# Patient Record
Sex: FEMALE | Race: WHITE | NOT HISPANIC OR LATINO | URBAN - METROPOLITAN AREA
[De-identification: names, ages, dates, MRNs, and addresses within clinical notes are randomized per-mention and may not be internally consistent; named-entity substitution may affect disease eponyms.]

---

## 2020-08-12 ENCOUNTER — LAB OUTSIDE AN ENCOUNTER (OUTPATIENT)
Dept: URBAN - METROPOLITAN AREA CLINIC 121 | Facility: CLINIC | Age: 45
End: 2020-08-12

## 2020-08-12 LAB
BASOPH COUNT: (no result)
BASOPHIL %: 0.7
EOS COUNT: (no result)
EOSINOPHIL %: 2.8
FERRITIN: 193
HCT: 43.1
HGB: 14.4
IRON SATUR %: (no result)
IRON: 111
LYMPHS %: 6.2
MCH: 32.2
MCHC: (no result)
MCV: 96.4
MONOCYTE %: 7.6
MONOSCT AUTO: (no result)
PLATELETS: (no result)
PMN %: 82.7
RBC: (no result)
RDW: 12.1
TIBC: (no result)
WBC: (no result)
ZZ-GE-UNK: (no result)

## 2020-08-17 ENCOUNTER — OFFICE VISIT (OUTPATIENT)
Dept: URBAN - METROPOLITAN AREA CLINIC 27 | Facility: CLINIC | Age: 45
End: 2020-08-17

## 2020-12-16 ENCOUNTER — LAB OUTSIDE AN ENCOUNTER (OUTPATIENT)
Dept: URBAN - METROPOLITAN AREA CLINIC 121 | Facility: CLINIC | Age: 45
End: 2020-12-16

## 2020-12-16 LAB
A/G RATIO: (no result)
ALBUMIN: 3.4
ALK PHOS: 43
B-12: 317
BASOPH COUNT: (no result)
BASOPHIL %: 0.6
BG RANDOM: 89
BILI TOTAL: 0.9
BUN/CREAT: (no result)
BUN: 13
CALCIUM: 9
CHLORIDE: 104
CO2: 26
CREATININE: 0.61
EOS COUNT: (no result)
EOSINOPHIL %: 1.7
FERRITIN: 132
FOLATE: 18
GLOBULIN TOT: (no result)
HCT: 38.5
HGB: 13.1
IRON SATUR %: (no result)
IRON: 94
LYMPHS %: 5.7
MCH: 32
MCHC: (no result)
MCV: 94.1
MONOCYTE %: 8.9
MONOSCT AUTO: (no result)
PLATELETS: (no result)
PMN %: 83.1
POTASSIUM: 4.4
PROTEIN, TOT: 5.4
RBC: (no result)
RDW: 12.5
SGOT (AST): 15
SGPT (ALT): 14
TIBC: (no result)
WBC: (no result)
ZZ-GE-UNK: (no result)
ZZ-GE-UNK: (no result)

## 2021-01-26 ENCOUNTER — OFFICE VISIT (OUTPATIENT)
Dept: URBAN - METROPOLITAN AREA CLINIC 27 | Facility: CLINIC | Age: 46
End: 2021-01-26

## 2021-08-26 ENCOUNTER — OFFICE VISIT (OUTPATIENT)
Dept: URBAN - METROPOLITAN AREA CLINIC 27 | Facility: CLINIC | Age: 46
End: 2021-08-26

## 2021-08-27 ENCOUNTER — LAB OUTSIDE AN ENCOUNTER (OUTPATIENT)
Dept: URBAN - METROPOLITAN AREA CLINIC 121 | Facility: CLINIC | Age: 46
End: 2021-08-27

## 2021-08-27 LAB
A/G RATIO: (no result)
ALBUMIN: 3.6
ALK PHOS: 42
B-12: 747
BASOPH COUNT: (no result)
BASOPHIL %: 0.6
BG RANDOM: 91
BILI TOTAL: 1.3
BUN/CREAT: (no result)
BUN: 13
CALCIUM: 9.2
CHLORIDE: 103
CO2: 28
CREATININE: 0.71
CRP: 0.25
EOS COUNT: (no result)
EOSINOPHIL %: 0.6
FOLATE: 18.5
GLOBULIN TOT: (no result)
HCT: 41.7
HGB: 13.9
LYMPHS %: 5.2
MCH: 31.9
MCHC: (no result)
MCV: 95.6
MONOCYTE %: 9
MONOSCT AUTO: (no result)
PLATELETS: (no result)
PMN %: 84.6
POTASSIUM: 4.3
PROTEIN, TOT: 5.6
RBC: (no result)
RDW: 12.4
SGOT (AST): 12
SGPT (ALT): 11
WBC: (no result)
ZZ-GE-UNK: (no result)
ZZ-GE-UNK: (no result)

## 2021-10-04 ENCOUNTER — OFFICE VISIT (OUTPATIENT)
Dept: URBAN - METROPOLITAN AREA SURGERY CENTER 7 | Facility: SURGERY CENTER | Age: 46
End: 2021-10-04

## 2021-10-09 ENCOUNTER — LAB OUTSIDE AN ENCOUNTER (OUTPATIENT)
Dept: URBAN - METROPOLITAN AREA CLINIC 121 | Facility: CLINIC | Age: 46
End: 2021-10-09

## 2021-10-09 LAB
BILI DIRECT: 0.2
BILI INDIREC: (no result)
BILI TOTAL: 1.3
FERRITIN: 57
INR: 1.1
IRON SATUR %: (no result)
IRON: 104
PT PATIENT: 10.9
TIBC: (no result)

## 2022-02-05 ENCOUNTER — LAB OUTSIDE AN ENCOUNTER (OUTPATIENT)
Dept: URBAN - METROPOLITAN AREA CLINIC 121 | Facility: CLINIC | Age: 47
End: 2022-02-05

## 2022-02-05 LAB
A/G RATIO: (no result)
ALBUMIN: 3.3
ALK PHOS: 43
B-12: 717
BASOPH COUNT: (no result)
BASOPHIL %: 0.7
BG RANDOM: 69
BILI TOTAL: 1.1
BUN/CREAT: (no result)
BUN: 15
CALCIUM: 8.9
CHLORIDE: 108
CO2: 25
CREATININE: 0.68
EOS COUNT: (no result)
EOSINOPHIL %: 1.8
FERRITIN: 6
FOLATE: 14.7
GLOBULIN TOT: (no result)
HCT: 38.5
HGB: 12.6
IRON SATUR %: (no result)
IRON: 84
LYMPHS %: 7.9
MCH: 31.2
MCHC: (no result)
MCV: 95.3
MONOCYTE %: 9.8
MONOSCT AUTO: (no result)
PLATELETS: (no result)
PMN %: 79.8
POTASSIUM: 4.3
PROTEIN, TOT: 5.4
RBC: (no result)
RDW: 13.2
RETIC COUNT: 1.7
SGOT (AST): 11
SGPT (ALT): 13
TIBC: (no result)
WBC: (no result)
ZZ-GE-UNK: (no result)

## 2022-03-07 ENCOUNTER — OFFICE VISIT (OUTPATIENT)
Dept: URBAN - METROPOLITAN AREA CLINIC 27 | Facility: CLINIC | Age: 47
End: 2022-03-07

## 2022-04-30 ENCOUNTER — TELEPHONE ENCOUNTER (OUTPATIENT)
Dept: URBAN - METROPOLITAN AREA CLINIC 121 | Facility: CLINIC | Age: 47
End: 2022-04-30

## 2022-04-30 RX ORDER — MERCAPTOPURINE 50 MG/1
1 PO QD TABLET ORAL
OUTPATIENT
Start: 2013-04-03

## 2022-04-30 RX ORDER — MERCAPTOPURINE 50 MG/1
TAKE 1 1/2 TABLET BY MOUTH DAILY TABLET ORAL
OUTPATIENT
Start: 2020-02-10

## 2022-04-30 RX ORDER — MOMETASONE FUROATE 1 MG/G
CREAM TOPICAL
OUTPATIENT
Start: 2007-10-01

## 2022-04-30 RX ORDER — CYANOCOBALAMIN 1000 UG/ML
INJECT 1ML UNDTHE SKIN ONCE WEEKLY INJECTION INTRAMUSCULAR; SUBCUTANEOUS
OUTPATIENT
Start: 2021-09-27

## 2022-04-30 RX ORDER — CYANOCOBALAMIN 1000 UG/ML
INJECT 1ML SUBCUTANEOUSLY ONCE WEEKLY FOR 1 MONTH INJECTION INTRAMUSCULAR; SUBCUTANEOUS
OUTPATIENT
Start: 2019-05-13

## 2022-04-30 RX ORDER — LORATADINE 5 MG
TABLET,CHEWABLE ORAL
OUTPATIENT
Start: 2017-06-20

## 2022-04-30 RX ORDER — SYRINGE WITH NEEDLE, 1 ML 25GX5/8"
USE AS DIRECTED WITH B12 INJECTION SYRINGE, EMPTY DISPOSABLE MISCELLANEOUS
OUTPATIENT
Start: 2014-05-16

## 2022-04-30 RX ORDER — MERCAPTOPURINE 50 MG/1
TAKE 1/2 TABLET BY MOUTH ONCE A DAY TAKE 1 1/2 TAB PO QD TABLET ORAL
OUTPATIENT
Start: 2021-12-01 | End: 2021-12-09

## 2022-04-30 RX ORDER — CYANOCOBALAMIN 1000 UG/ML
INJECT 1ML UNDTHE SKIN ONCE WEEKLY INJECTION INTRAMUSCULAR; SUBCUTANEOUS
OUTPATIENT
Start: 2021-09-27 | End: 2021-11-02

## 2022-04-30 RX ORDER — MOMETASONE FUROATE 1 MG/G
CREAM TOPICAL
OUTPATIENT
Start: 2013-04-03

## 2022-04-30 RX ORDER — CYANOCOBALAMIN 1000 UG/ML
INJECT 1ML SUBQ EVERY MONTH X 10 MONTHS INJECTION INTRAMUSCULAR; SUBCUTANEOUS
OUTPATIENT
Start: 2014-05-16

## 2022-04-30 RX ORDER — MERCAPTOPURINE 50 MG/1
TAKE 1/2 TABLET BY MOUTH ONCE A DAY TAKE 1 1/2 TAB PO QD TABLET ORAL
OUTPATIENT
Start: 2021-12-01

## 2022-04-30 RX ORDER — MERCAPTOPURINE 50 MG/1
TAKE 1 1/2 TABLET BY MOUTH DAILY TABLET ORAL
OUTPATIENT
Start: 2020-02-10 | End: 2021-12-01

## 2022-04-30 RX ORDER — CYANOCOBALAMIN 1000 UG/ML
INJECT 1 ML UNDER THE SKIN ONCE WEEKLY INJECTION INTRAMUSCULAR; SUBCUTANEOUS
OUTPATIENT
Start: 2021-11-02

## 2022-04-30 RX ORDER — CYANOCOBALAMIN 1000 UG/ML
INJECT 1ML UNDER THE SKIN ONCE WEEKLY INJECTION INTRAMUSCULAR; SUBCUTANEOUS
OUTPATIENT
Start: 2021-02-20

## 2022-04-30 RX ORDER — IBUPROFEN 200 MG/1
TABLET, FILM COATED ORAL
OUTPATIENT
Start: 2007-10-01

## 2022-04-30 RX ORDER — HEPATITIS A AND HEPATITIS B (RECOMBINANT) VACCINE 720; 20 [IU]/ML; UG/ML
INJECT 1 SYRINGE INTRAMUSCULARLY SINGLE DOSE INJECTION, SUSPENSION INTRAMUSCULAR
OUTPATIENT
Start: 2022-03-08 | End: 2022-03-09

## 2022-04-30 RX ORDER — SYRINGE WITH NEEDLE, 1 ML 25GX5/8"
USE AS DIRECTED WITH B12 INJECTION SYRINGE, EMPTY DISPOSABLE MISCELLANEOUS
OUTPATIENT
Start: 2015-06-05

## 2022-04-30 RX ORDER — MERCAPTOPURINE 50 MG/1
TAKE 1 1/2 TABLET BY MOUTH DAILY TABLET ORAL
OUTPATIENT
Start: 2021-12-09

## 2022-04-30 RX ORDER — CYANOCOBALAMIN 1000 UG/ML
INJECT 1ML UNDER THE SKIN ONCE WEEKLY INJECTION INTRAMUSCULAR; SUBCUTANEOUS
OUTPATIENT
Start: 2021-02-20 | End: 2021-09-27

## 2022-04-30 RX ORDER — MERCAPTOPURINE 100 %
POWDER (GRAM) MISCELLANEOUS
OUTPATIENT
Start: 2007-10-01

## 2022-04-30 RX ORDER — CIPROFLOXACIN HCL 500 MG
1 PO BID X7 DAYS TABLET ORAL
OUTPATIENT
Start: 2014-05-16

## 2022-04-30 RX ORDER — MOMETASONE FUROATE 1 MG/G
CREAM TOPICAL
OUTPATIENT
Start: 2013-04-03 | End: 2016-02-12

## 2022-04-30 RX ORDER — MERCAPTOPURINE 50 MG/1
1 TABLET PO QD TABLET ORAL
OUTPATIENT
Start: 2014-03-03

## 2022-04-30 RX ORDER — MERCAPTOPURINE 50 MG/1
1 PO QD TABLET ORAL
OUTPATIENT
Start: 2007-10-05

## 2022-04-30 RX ORDER — IBUPROFEN 200 MG/1
TABLET, FILM COATED ORAL
OUTPATIENT
Start: 2007-10-01 | End: 2013-04-03

## 2022-04-30 RX ORDER — CIPROFLOXACIN HCL 500 MG
1 PO BID X7 DAYS TABLET ORAL
OUTPATIENT
Start: 2014-05-16 | End: 2016-02-12

## 2022-04-30 RX ORDER — MERCAPTOPURINE 50 MG/1
1 PO QD TABLET ORAL
OUTPATIENT
Start: 2013-04-03 | End: 2016-02-12

## 2022-05-01 ENCOUNTER — TELEPHONE ENCOUNTER (OUTPATIENT)
Dept: URBAN - METROPOLITAN AREA CLINIC 121 | Facility: CLINIC | Age: 47
End: 2022-05-01

## 2022-05-01 RX ORDER — SYRINGE WITH NEEDLE, 1 ML 25GX5/8"
USE AS DIRECTED SYRINGE, EMPTY DISPOSABLE MISCELLANEOUS
Status: ACTIVE | COMMUNITY
Start: 2015-06-05

## 2022-05-01 RX ORDER — LORATADINE 5 MG
BY MOUTH TABLET,CHEWABLE ORAL
Status: ACTIVE | COMMUNITY
Start: 2017-06-20

## 2022-05-01 RX ORDER — MERCAPTOPURINE 50 MG/1
TAKE 1 1/2 TABLET BY MOUTH DAILY TABLET ORAL
Status: ACTIVE | COMMUNITY
Start: 2021-12-09 | End: 2022-06-19

## 2022-05-01 RX ORDER — CYANOCOBALAMIN 1000 UG/ML
INJECT 1 ML UNDER THE SKIN ONCE WEEKLY INJECTION INTRAMUSCULAR; SUBCUTANEOUS
Status: ACTIVE | COMMUNITY
Start: 2021-11-02 | End: 2022-06-24

## 2022-05-10 ENCOUNTER — TELEPHONE ENCOUNTER (OUTPATIENT)
Dept: URBAN - METROPOLITAN AREA CLINIC 27 | Facility: CLINIC | Age: 47
End: 2022-05-10

## 2022-05-12 ENCOUNTER — TELEPHONE ENCOUNTER (OUTPATIENT)
Dept: URBAN - METROPOLITAN AREA CLINIC 27 | Facility: CLINIC | Age: 47
End: 2022-05-12

## 2022-06-14 ENCOUNTER — LAB OUTSIDE AN ENCOUNTER (OUTPATIENT)
Dept: URBAN - METROPOLITAN AREA CLINIC 27 | Facility: CLINIC | Age: 47
End: 2022-06-14

## 2022-06-15 LAB
ABSOLUTE BASOPHILS: 29
ABSOLUTE EOSINOPHILS: 122
ABSOLUTE LYMPHOCYTES: 336
ABSOLUTE MONOCYTES: 476
ABSOLUTE NEUTROPHILS: 4837
BASOPHILS: 0.5
EOSINOPHILS: 2.1
HEMATOCRIT: 36.4
HEMOGLOBIN: 12.1
LYMPHOCYTES: 5.8
MCH: 30.9
MCHC: 33.2
MCV: 93.1
MONOCYTES: 8.2
MPV: 10.2
NEUTROPHILS: 83.4
PLATELET COUNT: 344
RDW: 14.8
RED BLOOD CELL COUNT: 3.91
WHITE BLOOD CELL COUNT: 5.8

## 2022-06-20 ENCOUNTER — ERX REFILL RESPONSE (OUTPATIENT)
Dept: URBAN - METROPOLITAN AREA CLINIC 27 | Facility: CLINIC | Age: 47
End: 2022-06-20

## 2022-06-20 RX ORDER — MERCAPTOPURINE 50 MG/1
TAKE 1 AND 1/2 BY MOUTH DAILY TABLET ORAL
Qty: 45 TABLET | Refills: 0 | OUTPATIENT

## 2022-06-21 ENCOUNTER — ERX REFILL RESPONSE (OUTPATIENT)
Dept: URBAN - METROPOLITAN AREA CLINIC 27 | Facility: CLINIC | Age: 47
End: 2022-06-21

## 2022-06-21 RX ORDER — CYANOCOBALAMIN 1000 UG/ML
INJECT 1ML UNTHE SKIN ONCE WEEKLY INJECTION INTRAMUSCULAR; SUBCUTANEOUS
Qty: 4 MILLILITER | Refills: 0 | OUTPATIENT

## 2022-06-21 RX ORDER — CYANOCOBALAMIN 1000 UG/ML
INJECT 1 ML UNDER THE SKIN ONCE WEEKLY INJECTION INTRAMUSCULAR; SUBCUTANEOUS
OUTPATIENT

## 2022-07-06 ENCOUNTER — LAB OUTSIDE AN ENCOUNTER (OUTPATIENT)
Dept: URBAN - METROPOLITAN AREA CLINIC 27 | Facility: CLINIC | Age: 47
End: 2022-07-06

## 2022-07-07 LAB
FERRITIN, SERUM: 6
IRON BIND.CAP.(TIBC): 430
IRON SATURATION: 19
IRON: 80

## 2022-07-18 ENCOUNTER — TELEPHONE ENCOUNTER (OUTPATIENT)
Dept: URBAN - METROPOLITAN AREA CLINIC 27 | Facility: CLINIC | Age: 47
End: 2022-07-18

## 2022-07-18 RX ORDER — MERCAPTOPURINE 50 MG/1
TAKE 1 AND 1/2 BY MOUTH TABLET ORAL
Qty: 50 | Refills: 3

## 2022-07-18 RX ORDER — CYANOCOBALAMIN 1000 UG/ML
INJECT 1ML INJECTION INTRAMUSCULAR; SUBCUTANEOUS
Qty: 6 | Refills: 2

## 2022-07-19 ENCOUNTER — ERX REFILL RESPONSE (OUTPATIENT)
Dept: URBAN - METROPOLITAN AREA CLINIC 27 | Facility: CLINIC | Age: 47
End: 2022-07-19

## 2022-07-19 RX ORDER — MERCAPTOPURINE 50 MG/1
TAKE 1 AND 1/2 BY MOUTH TABLET ORAL
Qty: 50 | Refills: 3 | OUTPATIENT

## 2022-07-19 RX ORDER — MERCAPTOPURINE 50 MG/1
TAKE 1 AND 1/2 BY MOUTH DAILY TABLET ORAL
Qty: 45 TABLET | Refills: 0 | OUTPATIENT

## 2022-08-23 ENCOUNTER — TELEPHONE ENCOUNTER (OUTPATIENT)
Dept: URBAN - METROPOLITAN AREA CLINIC 27 | Facility: CLINIC | Age: 47
End: 2022-08-23

## 2022-09-14 ENCOUNTER — WEB ENCOUNTER (OUTPATIENT)
Dept: URBAN - METROPOLITAN AREA CLINIC 27 | Facility: CLINIC | Age: 47
End: 2022-09-14

## 2022-09-16 ENCOUNTER — OFFICE VISIT (OUTPATIENT)
Dept: URBAN - METROPOLITAN AREA CLINIC 27 | Facility: CLINIC | Age: 47
End: 2022-09-16
Payer: COMMERCIAL

## 2022-09-16 VITALS
TEMPERATURE: 96.9 F | HEIGHT: 62 IN | WEIGHT: 129 LBS | BODY MASS INDEX: 23.74 KG/M2 | RESPIRATION RATE: 17 BRPM | SYSTOLIC BLOOD PRESSURE: 112 MMHG | DIASTOLIC BLOOD PRESSURE: 83 MMHG | HEART RATE: 80 BPM

## 2022-09-16 DIAGNOSIS — K50.90 CROHN'S DISEASE, UNSPECIFIED, WITHOUT COMPLICATIONS: ICD-10-CM

## 2022-09-16 DIAGNOSIS — D51.9 VITAMIN B12 DEFICIENCY: ICD-10-CM

## 2022-09-16 DIAGNOSIS — E61.1 IRON DEFICIENCY: ICD-10-CM

## 2022-09-16 PROBLEM — 35240004 IRON DEFICIENCY: Status: ACTIVE | Noted: 2022-09-16

## 2022-09-16 PROCEDURE — 99214 OFFICE O/P EST MOD 30 MIN: CPT | Performed by: INTERNAL MEDICINE

## 2022-09-16 RX ORDER — LORATADINE 5 MG
BY MOUTH TABLET,CHEWABLE ORAL
Status: ACTIVE | COMMUNITY
Start: 2017-06-20

## 2022-09-16 RX ORDER — MERCAPTOPURINE 50 MG/1
TAKE 1 AND 1/2 BY MOUTH DAILY TABLET ORAL
Qty: 45 TABLET | Refills: 0 | Status: ACTIVE | COMMUNITY

## 2022-09-16 RX ORDER — SYRINGE WITH NEEDLE, 1 ML 25GX5/8"
USE AS DIRECTED SYRINGE, EMPTY DISPOSABLE MISCELLANEOUS
Status: ACTIVE | COMMUNITY
Start: 2015-06-05

## 2022-09-16 RX ORDER — CYANOCOBALAMIN 1000 UG/ML
INJECT 1ML INJECTION INTRAMUSCULAR; SUBCUTANEOUS
Qty: 6 | Refills: 2 | Status: ACTIVE | COMMUNITY

## 2022-09-16 NOTE — HPI-TODAY'S VISIT:
This is a 47-year-old female seen in follow-up consultation for her Crohn's disease.  She is at her baseline.  She does have chronic mild abdominal discomfort.  She is careful with what she eats.  She has gained about 3 or 4 pounds.  She just finished her hepatitis A and B vaccine.  She took her fourth booster.  Her DEXA scan last year was okay.  She has B12 deficiency.  Laboratory studies in July revealed a normal CBC and a ferritin of 6.  She subsequently received 2 infusions of Injectafer.  Last colonoscopy with normal biopsies most of the disease and stricturing remains within the small intestine.  Last MR enteroscopy in 2018 was reviewed.  Recent dermatologic exam with removal of moles.  She is up-to-date with her eye exams with mild cataracts

## 2022-11-29 ENCOUNTER — TELEPHONE ENCOUNTER (OUTPATIENT)
Dept: URBAN - METROPOLITAN AREA CLINIC 27 | Facility: CLINIC | Age: 47
End: 2022-11-29

## 2022-11-29 RX ORDER — CYANOCOBALAMIN 1000 UG/ML
INJECT 1ML INJECTION INTRAMUSCULAR; SUBCUTANEOUS
Qty: 6 | Refills: 2
End: 2023-02-21

## 2022-11-29 RX ORDER — MERCAPTOPURINE 50 MG/1
TAKE 1 AND 1/2 BY MOUTH DAILY TABLET ORAL
Qty: 45 TABLET | Refills: 0

## 2022-12-27 ENCOUNTER — TELEPHONE ENCOUNTER (OUTPATIENT)
Dept: URBAN - METROPOLITAN AREA CLINIC 27 | Facility: CLINIC | Age: 47
End: 2022-12-27

## 2023-01-12 ENCOUNTER — LAB OUTSIDE AN ENCOUNTER (OUTPATIENT)
Dept: URBAN - METROPOLITAN AREA CLINIC 27 | Facility: CLINIC | Age: 48
End: 2023-01-12

## 2023-01-13 LAB
ABSOLUTE BASOPHILS: 31
ABSOLUTE EOSINOPHILS: 68
ABSOLUTE LYMPHOCYTES: 298
ABSOLUTE MONOCYTES: 490
ABSOLUTE NEUTROPHILS: 5313
BASOPHILS: 0.5
CHOL/HDLC RATIO: 2.7
CHOLESTEROL, TOTAL: 252
EOSINOPHILS: 1.1
FERRITIN, SERUM: 259
FOLATE (FOLIC ACID), SERUM: 15.5
HDL CHOLESTEROL: 94
HEMATOCRIT: 38.1
HEMOGLOBIN: 13
IRON BIND.CAP.(TIBC): 274
IRON SATURATION: 35
IRON: 97
LDL CHOLESTEROL CALC: 128
LYMPHOCYTES: 4.8
MCH: 32.5
MCHC: 34.1
MCV: 95.3
MONOCYTES: 7.9
MPV: 10.2
NEUTROPHILS: 85.7
NON HDL CHOLESTEROL: 158
PLATELET COUNT: 314
RDW: 12.2
RED BLOOD CELL COUNT: 4
TRIGLYCERIDES: 180
VITAMIN B12: 463
WHITE BLOOD CELL COUNT: 6.2

## 2023-01-24 ENCOUNTER — ERX REFILL RESPONSE (OUTPATIENT)
Dept: URBAN - METROPOLITAN AREA CLINIC 27 | Facility: CLINIC | Age: 48
End: 2023-01-24

## 2023-01-24 RX ORDER — MERCAPTOPURINE 50 MG/1
TAKE 1 AND 1/2 BY MOUTH DAILY TABLET ORAL
Qty: 45 TABLET | Refills: 0 | OUTPATIENT

## 2023-01-24 RX ORDER — MERCAPTOPURINE 50 MG/1
TAKE 1 AND 1/2 BY MOUTH DAILY TABLET ORAL
Qty: 45 TABLET | Refills: 3 | OUTPATIENT

## 2023-03-16 ENCOUNTER — OFFICE VISIT (OUTPATIENT)
Dept: URBAN - METROPOLITAN AREA CLINIC 27 | Facility: CLINIC | Age: 48
End: 2023-03-16
Payer: COMMERCIAL

## 2023-03-16 VITALS
HEART RATE: 76 BPM | SYSTOLIC BLOOD PRESSURE: 122 MMHG | RESPIRATION RATE: 17 BRPM | HEIGHT: 62 IN | WEIGHT: 129 LBS | DIASTOLIC BLOOD PRESSURE: 92 MMHG | BODY MASS INDEX: 23.74 KG/M2

## 2023-03-16 DIAGNOSIS — E61.1 IRON DEFICIENCY: ICD-10-CM

## 2023-03-16 DIAGNOSIS — K50.90 CROHN'S DISEASE, UNSPECIFIED, WITHOUT COMPLICATIONS: ICD-10-CM

## 2023-03-16 PROCEDURE — 99214 OFFICE O/P EST MOD 30 MIN: CPT | Performed by: INTERNAL MEDICINE

## 2023-03-16 RX ORDER — SYRINGE WITH NEEDLE, 1 ML 25GX5/8"
USE AS DIRECTED SYRINGE, EMPTY DISPOSABLE MISCELLANEOUS
Status: ACTIVE | COMMUNITY
Start: 2015-06-05

## 2023-03-16 RX ORDER — MERCAPTOPURINE 50 MG/1
TAKE 1 AND 1/2 BY MOUTH DAILY TABLET ORAL
Qty: 45 TABLET | Refills: 3 | Status: ACTIVE | COMMUNITY

## 2023-03-16 RX ORDER — LORATADINE 5 MG
BY MOUTH TABLET,CHEWABLE ORAL
Status: ACTIVE | COMMUNITY
Start: 2017-06-20

## 2023-03-16 NOTE — HPI-TODAY'S VISIT:
This is is a 47-year-old female seen in follow-up consultation for Crohn's.  She states she has been doing well over the past year.  She takes MiraLAX every day and that helps her with symptoms.  She has had no change in abdominal discomfort.  She received iron injections in the fall.  In January her ferritin was 259.  Hematocrit was 38.  B12 was 463.  She does have some joint pain but does not want to further evaluate that at this point.  She exercises regularly she gets regular massages which help.  She had a recent cholesterol that was not fasting and knows she needs to get a proper fasting cholesterol to understand her risks.  Overall she is doing well.  She has gained a few pounds this past year.  She has received vaccination for hepatitis a and B.

## 2023-05-01 ENCOUNTER — ERX REFILL RESPONSE (OUTPATIENT)
Dept: URBAN - METROPOLITAN AREA CLINIC 27 | Facility: CLINIC | Age: 48
End: 2023-05-01

## 2023-05-01 RX ORDER — CYANOCOBALAMIN 1000 UG/ML
INJECT 1ML UNDER THE SKIN ONCE A WEEK INJECTION INTRAMUSCULAR; SUBCUTANEOUS
Qty: 6 MILLILITER | Refills: 0 | OUTPATIENT

## 2023-05-18 ENCOUNTER — TELEPHONE ENCOUNTER (OUTPATIENT)
Dept: URBAN - METROPOLITAN AREA CLINIC 27 | Facility: CLINIC | Age: 48
End: 2023-05-18

## 2023-05-22 ENCOUNTER — ERX REFILL RESPONSE (OUTPATIENT)
Dept: URBAN - METROPOLITAN AREA CLINIC 27 | Facility: CLINIC | Age: 48
End: 2023-05-22

## 2023-05-22 RX ORDER — MERCAPTOPURINE 50 MG/1
TAKE 1 AND 1/2 BY MOUTH DAILY TABLET ORAL
Qty: 45 TABLET | Refills: 3 | OUTPATIENT

## 2023-05-22 RX ORDER — MERCAPTOPURINE 50 MG/1
TAKE 1 AND 1/2 BY MOUTH DAILY TABLET ORAL
Qty: 45 TABLET | Refills: 0 | OUTPATIENT

## 2023-05-26 ENCOUNTER — LAB OUTSIDE AN ENCOUNTER (OUTPATIENT)
Dept: URBAN - METROPOLITAN AREA CLINIC 27 | Facility: CLINIC | Age: 48
End: 2023-05-26

## 2023-06-02 LAB
ABSOLUTE BASOPHILS: 29
ABSOLUTE EOSINOPHILS: 70
ABSOLUTE LYMPHOCYTES: 267
ABSOLUTE MONOCYTES: 458
ABSOLUTE NEUTROPHILS: 4976
BASOPHILS: 0.5
CALPROTECTIN, FECAL: 109
EOSINOPHILS: 1.2
FERRITIN, SERUM: 185
HEMATOCRIT: 41.5
HEMOGLOBIN: 13.7
HEPATITIS A AB, TOTAL: REACTIVE
HEPATITIS B SURFACE AB IMMUNITY, QN: <5
IRON BIND.CAP.(TIBC): 285
IRON SATURATION: 54
IRON: 154
LYMPHOCYTES: 4.6
MCH: 31.3
MCHC: 33
MCV: 94.7
MONOCYTES: 7.9
MPV: 10.3
NEUTROPHILS: 85.8
PLATELET COUNT: 256
RDW: 12.6
RED BLOOD CELL COUNT: 4.38
WHITE BLOOD CELL COUNT: 5.8

## 2023-06-21 ENCOUNTER — ERX REFILL RESPONSE (OUTPATIENT)
Dept: URBAN - METROPOLITAN AREA CLINIC 27 | Facility: CLINIC | Age: 48
End: 2023-06-21

## 2023-06-21 RX ORDER — MERCAPTOPURINE 50 MG/1
TAKE 1 AND 1/2 BY MOUTH DAILY TABLET ORAL
Qty: 45 TABLET | Refills: 0 | OUTPATIENT

## 2023-06-26 ENCOUNTER — ERX REFILL RESPONSE (OUTPATIENT)
Dept: URBAN - METROPOLITAN AREA CLINIC 27 | Facility: CLINIC | Age: 48
End: 2023-06-26

## 2023-06-26 RX ORDER — CYANOCOBALAMIN 1000 UG/ML
INJECT 1ML UNDER THE SKIN ONCE A WEEK INJECTION INTRAMUSCULAR; SUBCUTANEOUS
Qty: 6 MILLILITER | Refills: 0 | OUTPATIENT

## 2023-07-24 ENCOUNTER — ERX REFILL RESPONSE (OUTPATIENT)
Dept: URBAN - METROPOLITAN AREA CLINIC 27 | Facility: CLINIC | Age: 48
End: 2023-07-24

## 2023-07-24 RX ORDER — MERCAPTOPURINE 50 MG/1
TAKE 1 AND 1/2 BY MOUTH DAILY TABLET ORAL
Qty: 45 TABLET | Refills: 0 | OUTPATIENT

## 2023-08-02 ENCOUNTER — TELEPHONE ENCOUNTER (OUTPATIENT)
Dept: URBAN - METROPOLITAN AREA CLINIC 27 | Facility: CLINIC | Age: 48
End: 2023-08-02

## 2023-08-02 RX ORDER — CYANOCOBALAMIN 1000 UG/ML
INJECT 1ML UNDER THE SKIN ONCE A WEEK INJECTION INTRAMUSCULAR; SUBCUTANEOUS
Qty: 4 PEN NEEDLE | Refills: 1

## 2023-08-21 ENCOUNTER — ERX REFILL RESPONSE (OUTPATIENT)
Dept: URBAN - METROPOLITAN AREA CLINIC 27 | Facility: CLINIC | Age: 48
End: 2023-08-21

## 2023-08-21 RX ORDER — MERCAPTOPURINE 50 MG/1
TAKE 1 AND 1/2 BY MOUTH DAILY TABLET ORAL
Qty: 45 TABLET | Refills: 0 | OUTPATIENT

## 2023-09-12 ENCOUNTER — WEB ENCOUNTER (OUTPATIENT)
Dept: URBAN - METROPOLITAN AREA CLINIC 27 | Facility: CLINIC | Age: 48
End: 2023-09-12

## 2023-09-15 ENCOUNTER — LAB OUTSIDE AN ENCOUNTER (OUTPATIENT)
Dept: URBAN - METROPOLITAN AREA CLINIC 27 | Facility: CLINIC | Age: 48
End: 2023-09-15

## 2023-09-15 ENCOUNTER — OFFICE VISIT (OUTPATIENT)
Dept: URBAN - METROPOLITAN AREA CLINIC 27 | Facility: CLINIC | Age: 48
End: 2023-09-15
Payer: COMMERCIAL

## 2023-09-15 VITALS
DIASTOLIC BLOOD PRESSURE: 79 MMHG | RESPIRATION RATE: 17 BRPM | WEIGHT: 129 LBS | HEIGHT: 62 IN | BODY MASS INDEX: 23.74 KG/M2 | SYSTOLIC BLOOD PRESSURE: 129 MMHG | HEART RATE: 80 BPM

## 2023-09-15 DIAGNOSIS — K50.90 CROHN'S DISEASE, UNSPECIFIED, WITHOUT COMPLICATIONS: ICD-10-CM

## 2023-09-15 DIAGNOSIS — E61.1 IRON DEFICIENCY: ICD-10-CM

## 2023-09-15 PROCEDURE — 99213 OFFICE O/P EST LOW 20 MIN: CPT | Performed by: INTERNAL MEDICINE

## 2023-09-15 RX ORDER — SYRINGE WITH NEEDLE, 1 ML 25GX5/8"
USE AS DIRECTED SYRINGE, EMPTY DISPOSABLE MISCELLANEOUS
Status: ACTIVE | COMMUNITY
Start: 2015-06-05

## 2023-09-15 RX ORDER — MERCAPTOPURINE 50 MG/1
TAKE 1 AND 1/2 BY MOUTH DAILY TABLET ORAL
Qty: 45 TABLET | Refills: 0 | Status: ACTIVE | COMMUNITY

## 2023-09-15 RX ORDER — CYANOCOBALAMIN 1000 UG/ML
INJECT 1ML UNDER THE SKIN ONCE A WEEK INJECTION INTRAMUSCULAR; SUBCUTANEOUS
Qty: 4 PEN NEEDLE | Refills: 1 | Status: ACTIVE | COMMUNITY

## 2023-09-15 RX ORDER — LORATADINE 5 MG
BY MOUTH TABLET,CHEWABLE ORAL
Status: ACTIVE | COMMUNITY
Start: 2017-06-20

## 2023-09-15 NOTE — HPI-TODAY'S VISIT:
This is a 48-year-old female seen in consultation today for her Crohn's disease.  Generally she states that she is doing okay.  She knows she has some small bowel strictures and if she eats the wrong foods she will develop crampy abdominal discomfort.  Generally her bowel movements are okay.  Her last DEXA scan was in 21.  Colonoscopy as well in 21 with normal biopsies.  She takes Imuran 75 mg a day.  MR JONATHAN in 2018 showed small bowel strictures.  She has been iron deficient as well as B12 deficient.  She complains of some joint discomfort.  In May her calprotectin was 109 normal she has been vaccinated for hepatitis B twice.  She is immune to hepatitis A but continues with lack of immunity to hepatitis B .

## 2023-09-18 LAB
A/G RATIO: 1.8
ABSOLUTE BASOPHILS: 31
ABSOLUTE EOSINOPHILS: 67
ABSOLUTE LYMPHOCYTES: 317
ABSOLUTE MONOCYTES: 506
ABSOLUTE NEUTROPHILS: 5179
ALBUMIN: 3.4
ALKALINE PHOSPHATASE: 41
ALT (SGPT): 15
AST (SGOT): 15
BASOPHILS: 0.5
BILIRUBIN, TOTAL: 1.2
BUN/CREATININE RATIO: (no result)
BUN: 14
C-REACTIVE PROTEIN, QUANT: 1.2
CALCIUM: 8.9
CARBON DIOXIDE, TOTAL: 28
CHLORIDE: 105
CREATININE: 0.64
EGFR: 108
EOSINOPHILS: 1.1
FERRITIN, SERUM: 157
FOLATE (FOLIC ACID), SERUM: 19.5
GLOBULIN, TOTAL: 1.9
GLUCOSE: 79
HEMATOCRIT: 40.2
HEMOGLOBIN: 13.6
IRON BIND.CAP.(TIBC): 307
IRON SATURATION: 37
IRON: 113
LYMPHOCYTES: 5.2
MCH: 31.9
MCHC: 33.8
MCV: 94.1
MONOCYTES: 8.3
MPV: 10.5
NEUTROPHILS: 84.9
PLATELET COUNT: 247
POTASSIUM: 4.5
PROTEIN, TOTAL: 5.3
RDW: 12.5
RED BLOOD CELL COUNT: 4.27
SODIUM: 139
VITAMIN B12: 662
WHITE BLOOD CELL COUNT: 6.1

## 2023-09-22 ENCOUNTER — TELEPHONE ENCOUNTER (OUTPATIENT)
Dept: URBAN - METROPOLITAN AREA CLINIC 27 | Facility: CLINIC | Age: 48
End: 2023-09-22

## 2023-09-22 ENCOUNTER — ERX REFILL RESPONSE (OUTPATIENT)
Dept: URBAN - METROPOLITAN AREA CLINIC 27 | Facility: CLINIC | Age: 48
End: 2023-09-22

## 2023-09-22 RX ORDER — MERCAPTOPURINE 50 MG/1
TAKE 1 AND 1/2 BY MOUTH DAILY TABLET ORAL
Qty: 45 TABLET | Refills: 0 | OUTPATIENT

## 2023-09-22 RX ORDER — MERCAPTOPURINE 50 MG/1
TAKE 1 AND 1/2 BY MOUTH DAILY TABLET ORAL ONCE A DAY
Qty: 135 | Refills: 3

## 2023-11-10 ENCOUNTER — ERX REFILL RESPONSE (OUTPATIENT)
Dept: URBAN - METROPOLITAN AREA CLINIC 27 | Facility: CLINIC | Age: 48
End: 2023-11-10

## 2023-11-10 RX ORDER — MERCAPTOPURINE 50 MG/1
TAKE 1 AND 1/2 BY MOUTH DAILY TABLET ORAL ONCE A DAY
Qty: 135 | Refills: 3 | OUTPATIENT

## 2023-11-10 RX ORDER — MERCAPTOPURINE 50 MG/1
TAKE 1 AND 1/2 BY MOUTH DAILY TABLET ORAL
Qty: 45 TABLET | Refills: 0 | OUTPATIENT

## 2024-01-08 ENCOUNTER — ERX REFILL RESPONSE (OUTPATIENT)
Dept: URBAN - METROPOLITAN AREA CLINIC 27 | Facility: CLINIC | Age: 49
End: 2024-01-08

## 2024-01-08 RX ORDER — CYANOCOBALAMIN 1000 UG/ML
INJECT 1 MILLILITER INJECTION ONCE WEEKLY INJECTION INTRAMUSCULAR; SUBCUTANEOUS
Qty: 4 MILLILITER | Refills: 0 | OUTPATIENT

## 2024-01-08 RX ORDER — CYANOCOBALAMIN 1000 UG/ML
INJECT 1ML UNDER THE SKIN ONCE A WEEK INJECTION INTRAMUSCULAR; SUBCUTANEOUS
Qty: 4 PEN NEEDLE | Refills: 1 | OUTPATIENT

## 2024-04-12 ENCOUNTER — OV EP (OUTPATIENT)
Dept: URBAN - METROPOLITAN AREA CLINIC 27 | Facility: CLINIC | Age: 49
End: 2024-04-12
Payer: COMMERCIAL

## 2024-04-12 VITALS
DIASTOLIC BLOOD PRESSURE: 85 MMHG | WEIGHT: 120 LBS | HEIGHT: 62 IN | SYSTOLIC BLOOD PRESSURE: 124 MMHG | RESPIRATION RATE: 17 BRPM | BODY MASS INDEX: 22.08 KG/M2 | HEART RATE: 72 BPM

## 2024-04-12 DIAGNOSIS — E53.8 VITAMIN B 12 DEFICIENCY: ICD-10-CM

## 2024-04-12 DIAGNOSIS — K50.90 CROHN'S DISEASE, UNSPECIFIED, WITHOUT COMPLICATIONS: ICD-10-CM

## 2024-04-12 DIAGNOSIS — E61.1 IRON DEFICIENCY: ICD-10-CM

## 2024-04-12 PROCEDURE — 99214 OFFICE O/P EST MOD 30 MIN: CPT | Performed by: INTERNAL MEDICINE

## 2024-04-12 RX ORDER — LORATADINE 5 MG
BY MOUTH TABLET,CHEWABLE ORAL
Status: ACTIVE | COMMUNITY
Start: 2017-06-20

## 2024-04-12 RX ORDER — CYANOCOBALAMIN 1000 UG/ML
INJECT 1 MILLILITER INJECTION ONCE WEEKLY INJECTION INTRAMUSCULAR; SUBCUTANEOUS
Qty: 4 MILLILITER | Refills: 0 | Status: ACTIVE | COMMUNITY

## 2024-04-12 RX ORDER — SYRINGE WITH NEEDLE, 1 ML 25GX5/8"
USE AS DIRECTED SYRINGE, EMPTY DISPOSABLE MISCELLANEOUS
Status: ACTIVE | COMMUNITY
Start: 2015-06-05

## 2024-04-12 RX ORDER — MERCAPTOPURINE 50 MG/1
TAKE 1 AND 1/2 BY MOUTH DAILY TABLET ORAL
Qty: 45 TABLET | Refills: 0 | Status: ACTIVE | COMMUNITY

## 2024-04-12 NOTE — HPI-TODAY'S VISIT:
this is a 48-year-old female seen in follow-up consultation for Crohn's disease.  She has known small bowel stricturing.  She on her own decided to cut her 6-MP in half in January and developed increasing abdominal symptoms.  In February she increase the dose back to her normal and she is doing better now.  She also had her estrogen stopped recently and wonders whether that is contributing to some of her symptoms.  She saw Dr. Heath and was told she does not have arthritis.  She has not followed up for her DEXA scan.  She was vaccinated twice for hepatitis B but is not immune.  She is immune to hepatitis A.  She has B12 deficiency as well and previous iron deficiency

## 2024-04-17 LAB
6 MMP: 6369
6 TG: 249
A/G RATIO: 2.4
ALBUMIN: 4
ALKALINE PHOSPHATASE: 58
ALT (SGPT): 43
AST (SGOT): 21
BILIRUBIN, TOTAL: 2.2
BUN/CREATININE RATIO: (no result)
BUN: 15
C-REACTIVE PROTEIN, QUANT: 0.4
CALCIUM: 10
CARBON DIOXIDE, TOTAL: 30
CHLORIDE: 104
CREATININE: 0.64
EGFR: 108
FOLATE (FOLIC ACID), SERUM: 23.1
GLOBULIN, TOTAL: 1.7
GLUCOSE: 88
HEMATOCRIT: 40.8
HEMOGLOBIN: 13.6
MCH: 31.3
MCHC: 33.3
MCV: 94
MPV: 10.6
PLATELET COUNT: 256
POTASSIUM: 4.6
PROTEIN, TOTAL: 5.7
RDW: 13.5
RED BLOOD CELL COUNT: 4.34
SODIUM: 144
VITAMIN B12: 795
WHITE BLOOD CELL COUNT: 4.1

## 2024-04-26 LAB — CALPROTECTIN, FECAL: 177

## 2024-05-08 ENCOUNTER — TELEPHONE ENCOUNTER (OUTPATIENT)
Dept: URBAN - METROPOLITAN AREA CLINIC 27 | Facility: CLINIC | Age: 49
End: 2024-05-08

## 2024-05-14 LAB
ALBUMIN/GLOBULIN RATIO: 2.5
ALBUMIN: 3.5
ALKALINE PHOSPHATASE: 61
ALT (SGPT): 163
AST (SGOT): 51
BILIRUBIN, DIRECT: 0.2
BILIRUBIN, INDIRECT: 0.6
BILIRUBIN, TOTAL: 0.8
GLOBULIN: 1.4
IRON BIND.CAP.(TIBC): 280
IRON SATURATION: 23
IRON: 63
PROTEIN, TOTAL: 4.9

## 2024-05-20 ENCOUNTER — ERX REFILL RESPONSE (OUTPATIENT)
Dept: URBAN - METROPOLITAN AREA CLINIC 27 | Facility: CLINIC | Age: 49
End: 2024-05-20

## 2024-05-20 RX ORDER — CYANOCOBALAMIN 1000 UG/ML
INJECT 1 MILLILITER INJECTION ONCE WEEKLY INJECTION INTRAMUSCULAR; SUBCUTANEOUS
Qty: 4 MILLILITER | Refills: 0 | OUTPATIENT

## 2024-05-21 ENCOUNTER — TELEPHONE ENCOUNTER (OUTPATIENT)
Dept: URBAN - METROPOLITAN AREA CLINIC 27 | Facility: CLINIC | Age: 49
End: 2024-05-21

## 2024-05-30 ENCOUNTER — TELEPHONE ENCOUNTER (OUTPATIENT)
Dept: URBAN - METROPOLITAN AREA CLINIC 27 | Facility: CLINIC | Age: 49
End: 2024-05-30

## 2024-07-08 ENCOUNTER — ERX REFILL RESPONSE (OUTPATIENT)
Dept: URBAN - METROPOLITAN AREA CLINIC 27 | Facility: CLINIC | Age: 49
End: 2024-07-08

## 2024-07-08 RX ORDER — CYANOCOBALAMIN 1000 UG/ML
INJECT 1 MILLILITER INJECTION ONCE WEEKLY INJECTION INTRAMUSCULAR; SUBCUTANEOUS
Qty: 4 MILLILITER | Refills: 0 | OUTPATIENT

## 2024-07-22 ENCOUNTER — TELEPHONE ENCOUNTER (OUTPATIENT)
Dept: URBAN - METROPOLITAN AREA CLINIC 27 | Facility: CLINIC | Age: 49
End: 2024-07-22

## 2024-08-05 ENCOUNTER — ERX REFILL RESPONSE (OUTPATIENT)
Dept: URBAN - METROPOLITAN AREA CLINIC 27 | Facility: CLINIC | Age: 49
End: 2024-08-05

## 2024-08-05 RX ORDER — CYANOCOBALAMIN 1000 UG/ML
INJECT 1 MILLILITER INJECTION ONCE WEEKLY INJECTION INTRAMUSCULAR; SUBCUTANEOUS
Qty: 4 MILLILITER | Refills: 0 | OUTPATIENT

## 2024-09-05 ENCOUNTER — TELEPHONE ENCOUNTER (OUTPATIENT)
Dept: URBAN - METROPOLITAN AREA CLINIC 27 | Facility: CLINIC | Age: 49
End: 2024-09-05

## 2024-09-05 ENCOUNTER — ERX REFILL RESPONSE (OUTPATIENT)
Dept: URBAN - METROPOLITAN AREA CLINIC 27 | Facility: CLINIC | Age: 49
End: 2024-09-05

## 2024-09-05 RX ORDER — CYANOCOBALAMIN 1000 UG/ML
INJECT 1 MILLILITER INJECTION ONCE WEEKLY INJECTION INTRAMUSCULAR; SUBCUTANEOUS
Qty: 4 MILLILITER | Refills: 0 | OUTPATIENT

## 2024-10-07 ENCOUNTER — ERX REFILL RESPONSE (OUTPATIENT)
Dept: URBAN - METROPOLITAN AREA CLINIC 27 | Facility: CLINIC | Age: 49
End: 2024-10-07

## 2024-10-07 RX ORDER — CYANOCOBALAMIN 1000 UG/ML
INJECT 1 MILLILITER INJECTION ONCE WEEKLY INJECTION INTRAMUSCULAR; SUBCUTANEOUS
Qty: 4 MILLILITER | Refills: 0 | OUTPATIENT

## 2024-10-21 ENCOUNTER — CLAIMS CREATED FROM THE CLAIM WINDOW (OUTPATIENT)
Dept: URBAN - METROPOLITAN AREA SURGERY CENTER 7 | Facility: SURGERY CENTER | Age: 49
End: 2024-10-21
Payer: COMMERCIAL

## 2024-10-21 ENCOUNTER — CLAIMS CREATED FROM THE CLAIM WINDOW (OUTPATIENT)
Dept: URBAN - METROPOLITAN AREA CLINIC 4 | Facility: CLINIC | Age: 49
End: 2024-10-21
Payer: COMMERCIAL

## 2024-10-21 DIAGNOSIS — Z12.11 COLON CANCER SCREENING (HIGH RISK): ICD-10-CM

## 2024-10-21 DIAGNOSIS — K63.89 OTHER SPECIFIED DISEASES OF INTESTINE: ICD-10-CM

## 2024-10-21 DIAGNOSIS — K52.89 OTHER SPECIFIED NONINFECTIVE GASTROENTERITIS AND COLITIS: ICD-10-CM

## 2024-10-21 DIAGNOSIS — K52.9 NONINFECTIVE GASTROENTERITIS AND COLITIS, UNSPECIFIED: ICD-10-CM

## 2024-10-21 DIAGNOSIS — K50.10 CROHN'S COLITIS: ICD-10-CM

## 2024-10-21 PROCEDURE — 00812 ANES LWR INTST SCR COLSC: CPT | Performed by: REGISTERED NURSE

## 2024-10-21 PROCEDURE — 88305 TISSUE EXAM BY PATHOLOGIST: CPT | Performed by: PATHOLOGY

## 2024-10-21 PROCEDURE — 45380 COLONOSCOPY AND BIOPSY: CPT | Performed by: INTERNAL MEDICINE

## 2024-10-21 RX ORDER — CYANOCOBALAMIN 1000 UG/ML
INJECT 1 MILLILITER INJECTION ONCE WEEKLY INJECTION INTRAMUSCULAR; SUBCUTANEOUS
Qty: 4 MILLILITER | Refills: 0 | Status: ACTIVE | COMMUNITY

## 2024-10-21 RX ORDER — MERCAPTOPURINE 50 MG/1
TAKE 1 AND 1/2 BY MOUTH DAILY TABLET ORAL
Qty: 45 TABLET | Refills: 0 | Status: ACTIVE | COMMUNITY

## 2024-10-21 RX ORDER — LORATADINE 5 MG
BY MOUTH TABLET,CHEWABLE ORAL
Status: ACTIVE | COMMUNITY
Start: 2017-06-20

## 2024-10-21 RX ORDER — SYRINGE WITH NEEDLE, 1 ML 25GX5/8"
USE AS DIRECTED SYRINGE, EMPTY DISPOSABLE MISCELLANEOUS
Status: ACTIVE | COMMUNITY
Start: 2015-06-05

## 2024-10-25 LAB
A/G RATIO: 2.1
ALBUMIN: 3.7
ALKALINE PHOSPHATASE: 63
ALT (SGPT): 21
AST (SGOT): 21
BILIRUBIN, TOTAL: 1.4
BUN/CREATININE RATIO: (no result)
BUN: 13
CALCIUM: 9.5
CARBON DIOXIDE, TOTAL: 26
CHLORIDE: 104
CHOL/HDLC RATIO: 2.5
CHOLESTEROL, TOTAL: 191
CREATININE: 0.67
EGFR: 106
GLOBULIN, TOTAL: 1.8
GLUCOSE: 79
HDL CHOLESTEROL: 75
LDL CHOLESTEROL CALC: 101
NON HDL CHOLESTEROL: 116
POTASSIUM: 5.2
PROTEIN, TOTAL: 5.5
SODIUM: 139
TRIGLYCERIDES: 64

## 2024-11-04 ENCOUNTER — ERX REFILL RESPONSE (OUTPATIENT)
Dept: URBAN - METROPOLITAN AREA CLINIC 27 | Facility: CLINIC | Age: 49
End: 2024-11-04

## 2024-11-04 RX ORDER — CYANOCOBALAMIN 1000 UG/ML
INJECT 1 MILLILITER INJECTION ONCE WEEKLY INJECTION INTRAMUSCULAR; SUBCUTANEOUS
Qty: 4 MILLILITER | Refills: 0 | OUTPATIENT

## 2024-11-07 ENCOUNTER — DASHBOARD ENCOUNTERS (OUTPATIENT)
Age: 49
End: 2024-11-07

## 2024-11-07 ENCOUNTER — OFFICE VISIT (OUTPATIENT)
Dept: URBAN - METROPOLITAN AREA CLINIC 27 | Facility: CLINIC | Age: 49
End: 2024-11-07
Payer: COMMERCIAL

## 2024-11-07 VITALS
HEART RATE: 80 BPM | BODY MASS INDEX: 22.26 KG/M2 | DIASTOLIC BLOOD PRESSURE: 87 MMHG | SYSTOLIC BLOOD PRESSURE: 113 MMHG | WEIGHT: 121 LBS | HEIGHT: 62 IN

## 2024-11-07 DIAGNOSIS — K50.90 CROHN'S DISEASE, UNSPECIFIED, WITHOUT COMPLICATIONS: ICD-10-CM

## 2024-11-07 DIAGNOSIS — E53.8 VITAMIN B 12 DEFICIENCY: ICD-10-CM

## 2024-11-07 DIAGNOSIS — R74.8 ABNORMAL LIVER ENZYMES: ICD-10-CM

## 2024-11-07 PROCEDURE — 99214 OFFICE O/P EST MOD 30 MIN: CPT | Performed by: INTERNAL MEDICINE

## 2024-11-07 RX ORDER — MERCAPTOPURINE 50 MG/1
TAKE 1 AND 1/2 BY MOUTH DAILY TABLET ORAL
Qty: 45 TABLET | Refills: 0 | Status: DISCONTINUED | COMMUNITY

## 2024-11-07 RX ORDER — SYRINGE WITH NEEDLE, 1 ML 25GX5/8"
USE AS DIRECTED SYRINGE, EMPTY DISPOSABLE MISCELLANEOUS
Status: DISCONTINUED | COMMUNITY
Start: 2015-06-05

## 2024-11-07 RX ORDER — CYANOCOBALAMIN 1000 UG/ML
INJECT 1 MILLILITER INJECTION ONCE WEEKLY INJECTION INTRAMUSCULAR; SUBCUTANEOUS
Qty: 4 MILLILITER | Refills: 0 | Status: DISCONTINUED | COMMUNITY

## 2024-11-07 RX ORDER — LORATADINE 5 MG
BY MOUTH TABLET,CHEWABLE ORAL
Status: ACTIVE | COMMUNITY
Start: 2017-06-20

## 2024-11-07 NOTE — HPI-TODAY'S VISIT:
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  In April the stopped her 6-MP given elevated liver enzymes.  The enzymes have essentially returned to normal although total bilirubin remains at 1.4.  It was presumed that this was the cause.  She generally feels that she is doing okay.  She is having 5 or 6 small bowel movements a day.  She does state this is a little bit more frequent than prior to stopping the 6-MP.  Her weight is stable actually up a little bit.  There is no bleeding.  She is always managed her symptoms symptomatically.  She has B12 deficiency and is on replacement.  Colonoscopy in 2021 showed normal biopsies even in the area of the stricturing in the ileum.

## 2024-12-09 ENCOUNTER — ERX REFILL RESPONSE (OUTPATIENT)
Dept: URBAN - METROPOLITAN AREA CLINIC 27 | Facility: CLINIC | Age: 49
End: 2024-12-09

## 2024-12-09 RX ORDER — CYANOCOBALAMIN 1000 UG/ML
INJECT 1 ML INTRAMUSCULARLY ONCE WEEKLY INJECTION INTRAMUSCULAR; SUBCUTANEOUS
Qty: 4 MILLILITER | Refills: 0 | OUTPATIENT

## 2024-12-26 ENCOUNTER — OFFICE VISIT (OUTPATIENT)
Dept: URBAN - METROPOLITAN AREA TELEHEALTH 2 | Facility: TELEHEALTH | Age: 49
End: 2024-12-26
Payer: COMMERCIAL

## 2024-12-26 ENCOUNTER — TELEPHONE ENCOUNTER (OUTPATIENT)
Dept: URBAN - METROPOLITAN AREA CLINIC 27 | Facility: CLINIC | Age: 49
End: 2024-12-26

## 2024-12-26 DIAGNOSIS — K50.90 CROHN'S DISEASE, UNSPECIFIED, WITHOUT COMPLICATIONS: ICD-10-CM

## 2024-12-26 DIAGNOSIS — E53.8 VITAMIN B 12 DEFICIENCY: ICD-10-CM

## 2024-12-26 DIAGNOSIS — R74.8 ABNORMAL LIVER ENZYMES: ICD-10-CM

## 2024-12-26 PROCEDURE — 99213 OFFICE O/P EST LOW 20 MIN: CPT | Performed by: INTERNAL MEDICINE

## 2024-12-26 RX ORDER — CYANOCOBALAMIN 1000 UG/ML
INJECT 1 ML INTRAMUSCULARLY ONCE WEEKLY INJECTION INTRAMUSCULAR; SUBCUTANEOUS
Qty: 4 MILLILITER | Refills: 0 | Status: ACTIVE | COMMUNITY

## 2024-12-26 RX ORDER — LORATADINE 5 MG
BY MOUTH TABLET,CHEWABLE ORAL
Status: ACTIVE | COMMUNITY
Start: 2017-06-20

## 2024-12-26 NOTE — HPI-TODAY'S VISIT:
This is a 49-year-old female seen in follow-up consultation for her Crohn's disease.  She has chronic stricturing in the distal small bowel.  In the spring she was found to have elevated liver enzymes and that was thought secondary to the Imuran.  She stopped the medication and the liver enzymes have almost returned to normal with a slightly elevated bilirubin.  Other workup was unrevealing.  Currently she is at baseline.  Here to discuss future treatment.  Recent CRP was normal and calprotectin slightly elevated.

## 2024-12-30 LAB
A/G RATIO: 2
ALBUMIN: 3.6
ALKALINE PHOSPHATASE: 65
ALT (SGPT): 19
AST (SGOT): 18
BILIRUBIN, TOTAL: 1.2
BUN/CREATININE RATIO: (no result)
BUN: 16
CALCIUM: 9.5
CARBON DIOXIDE, TOTAL: 26
CHLORIDE: 107
CREATININE: 0.66
EGFR: 107
FOLATE, SERUM: 20.2
GLOBULIN, TOTAL: 1.8
GLUCOSE: 107
HEMATOCRIT: 40.9
HEMOGLOBIN: 13.1
HEPATITIS B SURFACE ANTIBODY QL: (no result)
MCH: 29.5
MCHC: 32
MCV: 92.1
MPV: 11
PLATELET COUNT: 236
POTASSIUM: 4.9
PROTEIN, TOTAL: 5.4
RDW: 12.3
RED BLOOD CELL COUNT: 4.44
SODIUM: 141
VITAMIN B12: 815
WHITE BLOOD CELL COUNT: 7.4

## 2025-01-10 ENCOUNTER — ERX REFILL RESPONSE (OUTPATIENT)
Dept: URBAN - METROPOLITAN AREA CLINIC 27 | Facility: CLINIC | Age: 50
End: 2025-01-10

## 2025-01-10 RX ORDER — CYANOCOBALAMIN 1000 UG/ML
INJECT 1 ML INTRAMUSCULARLY ONCE WEEKLY INJECTION INTRAMUSCULAR; SUBCUTANEOUS
Qty: 4 MILLILITER | Refills: 0 | OUTPATIENT

## 2025-07-02 ENCOUNTER — WEB ENCOUNTER (OUTPATIENT)
Dept: URBAN - METROPOLITAN AREA CLINIC 27 | Facility: CLINIC | Age: 50
End: 2025-07-02

## 2025-07-02 RX ORDER — CYANOCOBALAMIN 1000 UG/ML
INJECT 1 ML INTRAMUSCULARLY INJECTION INTRAMUSCULAR; SUBCUTANEOUS
Qty: 6 | Refills: 3

## 2025-07-11 ENCOUNTER — ERX REFILL RESPONSE (OUTPATIENT)
Dept: URBAN - METROPOLITAN AREA CLINIC 27 | Facility: CLINIC | Age: 50
End: 2025-07-11

## 2025-07-11 RX ORDER — CYANOCOBALAMIN 1000 UG/ML
INJECT 1 ML INTRAMUSCULARLY INJECTION INTRAMUSCULAR; SUBCUTANEOUS
Qty: 6 | Refills: 3 | OUTPATIENT

## 2025-07-11 RX ORDER — CYANOCOBALAMIN 1000 UG/ML
1 ML INJECTION INTRAMUSCULAR; SUBCUTANEOUS
Qty: 6 | Refills: 3 | OUTPATIENT